# Patient Record
Sex: MALE | Race: WHITE
[De-identification: names, ages, dates, MRNs, and addresses within clinical notes are randomized per-mention and may not be internally consistent; named-entity substitution may affect disease eponyms.]

---

## 2022-11-30 ENCOUNTER — HOSPITAL ENCOUNTER (EMERGENCY)
Dept: HOSPITAL 75 - ER FS | Age: 52
Discharge: HOME | End: 2022-11-30
Payer: COMMERCIAL

## 2022-11-30 VITALS — DIASTOLIC BLOOD PRESSURE: 82 MMHG | SYSTOLIC BLOOD PRESSURE: 144 MMHG

## 2022-11-30 VITALS — WEIGHT: 134.92 LBS | HEIGHT: 69.02 IN | BODY MASS INDEX: 19.98 KG/M2

## 2022-11-30 DIAGNOSIS — N39.0: ICD-10-CM

## 2022-11-30 DIAGNOSIS — Z28.310: ICD-10-CM

## 2022-11-30 DIAGNOSIS — K56.41: Primary | ICD-10-CM

## 2022-11-30 DIAGNOSIS — Z20.822: ICD-10-CM

## 2022-11-30 LAB
ALBUMIN SERPL-MCNC: 4.3 GM/DL (ref 3.2–4.5)
ALP SERPL-CCNC: 104 U/L (ref 40–136)
ALT SERPL-CCNC: 114 U/L (ref 0–55)
APTT PPP: YELLOW S
BACTERIA #/AREA URNS HPF: (no result) /HPF
BARBITURATES UR QL: NEGATIVE
BASOPHILS # BLD AUTO: 0 10^3/UL (ref 0–0.1)
BASOPHILS NFR BLD AUTO: 1 % (ref 0–10)
BENZODIAZ UR QL SCN: NEGATIVE
BILIRUB SERPL-MCNC: 0.4 MG/DL (ref 0.1–1)
BILIRUB UR QL STRIP: NEGATIVE
BUN/CREAT SERPL: 46
CALCIUM SERPL-MCNC: 9 MG/DL (ref 8.5–10.1)
CHLORIDE SERPL-SCNC: 103 MMOL/L (ref 98–107)
CO2 SERPL-SCNC: 24 MMOL/L (ref 21–32)
COCAINE UR QL: NEGATIVE
CREAT SERPL-MCNC: 0.67 MG/DL (ref 0.6–1.3)
EOSINOPHIL # BLD AUTO: 0.1 10^3/UL (ref 0–0.3)
EOSINOPHIL NFR BLD AUTO: 1 % (ref 0–10)
FIBRINOGEN PPP-MCNC: (no result) MG/DL
GFR SERPLBLD BASED ON 1.73 SQ M-ARVRAT: 112 ML/MIN
GLUCOSE SERPL-MCNC: 123 MG/DL (ref 70–105)
GLUCOSE UR STRIP-MCNC: NEGATIVE MG/DL
HCT VFR BLD CALC: 44 % (ref 40–54)
HGB BLD-MCNC: 15.1 G/DL (ref 13.3–17.7)
KETONES UR QL STRIP: (no result)
LEUKOCYTE ESTERASE UR QL STRIP: (no result)
LIPASE SERPL-CCNC: 37 U/L (ref 8–78)
LYMPHOCYTES # BLD AUTO: 2.7 10^3/UL (ref 1–4)
LYMPHOCYTES NFR BLD AUTO: 43 % (ref 12–44)
MANUAL DIFFERENTIAL PERFORMED BLD QL: NO
MCH RBC QN AUTO: 32 PG (ref 25–34)
MCHC RBC AUTO-ENTMCNC: 35 G/DL (ref 32–36)
MCV RBC AUTO: 91 FL (ref 80–99)
METHADONE UR QL SCN: NEGATIVE
MONOCYTES # BLD AUTO: 0.5 10^3/UL (ref 0–1)
MONOCYTES NFR BLD AUTO: 9 % (ref 0–12)
NEUTROPHILS # BLD AUTO: 2.9 10^3/UL (ref 1.8–7.8)
NEUTROPHILS NFR BLD AUTO: 46 % (ref 42–75)
NITRITE UR QL STRIP: POSITIVE
OPIATES UR QL SCN: NEGATIVE
OXYCODONE UR QL: NEGATIVE
PH UR STRIP: 5 [PH] (ref 5–9)
PLATELET # BLD: 237 10^3/UL (ref 130–400)
PMV BLD AUTO: 9.2 FL (ref 9–12.2)
POTASSIUM SERPL-SCNC: 3.9 MMOL/L (ref 3.6–5)
PROPOXYPH UR QL: NEGATIVE
PROT SERPL-MCNC: 7.5 GM/DL (ref 6.4–8.2)
PROT UR QL STRIP: NEGATIVE
RBC #/AREA URNS HPF: (no result) /HPF
SODIUM SERPL-SCNC: 141 MMOL/L (ref 135–145)
SP GR UR STRIP: >=1.03 (ref 1.02–1.02)
SQUAMOUS #/AREA URNS HPF: (no result) /HPF
TRICYCLICS UR QL SCN: NEGATIVE
WBC # BLD AUTO: 6.3 10^3/UL (ref 4.3–11)
WBC #/AREA URNS HPF: (no result) /HPF

## 2022-11-30 PROCEDURE — 87636 SARSCOV2 & INF A&B AMP PRB: CPT

## 2022-11-30 PROCEDURE — 87088 URINE BACTERIA CULTURE: CPT

## 2022-11-30 PROCEDURE — 83690 ASSAY OF LIPASE: CPT

## 2022-11-30 PROCEDURE — 87077 CULTURE AEROBIC IDENTIFY: CPT

## 2022-11-30 PROCEDURE — 81000 URINALYSIS NONAUTO W/SCOPE: CPT

## 2022-11-30 PROCEDURE — 80306 DRUG TEST PRSMV INSTRMNT: CPT

## 2022-11-30 PROCEDURE — 85025 COMPLETE CBC W/AUTO DIFF WBC: CPT

## 2022-11-30 PROCEDURE — 74177 CT ABD & PELVIS W/CONTRAST: CPT

## 2022-11-30 PROCEDURE — 80053 COMPREHEN METABOLIC PANEL: CPT

## 2022-11-30 PROCEDURE — 36415 COLL VENOUS BLD VENIPUNCTURE: CPT

## 2022-11-30 NOTE — ED ABDOMINAL PAIN
General


Stated Complaint:  ABD PAIN


Source of Information:  Patient


Exam Limitations:  No Limitations





History of Present Illness


Date Seen by Provider:  Nov 30, 2022


Time Seen by Provider:  15:20


Initial Comments


52-year-old male presents for 2 hours of abdominal pain.  He states is a burning

sensation in his lower abdomen bilaterally.  No history of abdominal surgeries 

or similar issues.  He also complains of loose stools.  Denies any fevers or 

chills.  No urinary symptoms.  No testicular pain.  No nausea or vomiting.  His 

mother is concerned that he may have "fallen off the wagon."  He has a history 

of amphetamine and opiate abuse per her report.





Allergies and Home Medications


Allergies


Coded Allergies:  


     No Known Drug Allergies (Unverified , 11/30/22)





Patient Home Medication List


Home Medication List Reviewed:  Yes


Ciprofloxacin HCl (Ciprofloxacin HCl) 500 Mg Tablet, 500 MG PO BID


   Prescribed by: ABHAY HECK MD on 11/30/22 0683





Review of Systems


Review of Systems


Constitutional:  no symptoms reported


EENTM:  No Symptoms Reported


Respiratory:  No Symptoms Reported


Cardiovascular:  No Symptoms Reported


Gastrointestinal:  Abdominal Pain, Diarrhea


Genitourinary:  No Symptoms Reported


Musculoskeletal:  no symptoms reported


Skin:  no symptoms reported


Psychiatric/Neurological:  No Symptoms Reported


Endocrine:  No Symptoms Reported


Hematologic/Lymphatic:  No Symptoms Reported





Past Medical-Social-Family Hx


Patient Social History


Tobacco Use?:  Yes


Use of E-Cig and/or Vaping dev:  No


Substance use?:  Yes


Alcohol Use?:  No





Family Medical History


Reviewed Nursing Family Hx


No Pertinent Family Hx





Physical Exam


Vital Signs





Vital Signs - First Documented








 11/30/22





 15:23


 


Temp 35.7


 


Pulse 75


 


Resp 20


 


B/P (MAP) 163/89 (113)


 


O2 Delivery Room Air





Capillary Refill :


Height/Weight/BMI


Height: '"


Weight: lbs. oz. kg;  BMI


Method:


General Appearance:  WD/WN, no apparent distress


HEENT:  normal ENT inspection, pharynx normal


Neck:  non-tender, full range of motion, supple, normal inspection


Respiratory:  chest non-tender, lungs clear, normal breath sounds, no 

respiratory distress, no accessory muscle use


Cardiovascular:  regular rate, rhythm, no edema, no gallop, no JVD, no murmur


Gastrointestinal:  normal bowel sounds, soft, no organomegaly, tenderness 

(Diffuse lower abdominal tenderness even with very light palpation of the skin. 

The patient is writhing on the bed)


Extremities:  normal range of motion, normal inspection, no calf tenderness


Back:  normal inspection, no CVA tenderness, no vertebral tenderness


Neurologic/Psychiatric:  alert, normal mood/affect, oriented x 3


Skin:  normal color, warm/dry


Lymphatic:  no adenopathy





Progress/Results/Core Measures


Results/Orders


Lab Results





Laboratory Tests








Test


 11/30/22


15:30 11/30/22


15:43 11/30/22


15:48 Range/Units


 


 


Urine Color YELLOW     


 


Urine Clarity SL CLOUDY     


 


Urine pH 5.0    5-9  


 


Urine Specific Gravity >=1.030    1.016-1.022  


 


Urine Protein NEGATIVE    NEGATIVE  


 


Urine Glucose (UA) NEGATIVE    NEGATIVE  


 


Urine Ketones TRACE H   NEGATIVE  


 


Urine Nitrite POSITIVE H   NEGATIVE  


 


Urine Bilirubin NEGATIVE    NEGATIVE  


 


Urine Urobilinogen 0.2    < = 1.0  MG/DL


 


Urine Leukocyte Esterase 1+ H   NEGATIVE  


 


Urine RBC (Auto) TRACE-I H   NEGATIVE  


 


Urine RBC NONE     /HPF


 


Urine WBC 5-10 H    /HPF


 


Urine Squamous Epithelial


Cells RARE 


 


 


  /HPF





 


Urine Crystals NONE     /LPF


 


Urine Bacteria FEW H    /HPF


 


Urine Casts NONE     /LPF


 


Urine Mucus NEGATIVE     /LPF


 


Urine Culture Indicated YES     


 


Urine Opiates Screen NEGATIVE    NEGATIVE  


 


Urine Oxycodone Screen NEGATIVE    NEGATIVE  


 


Urine Methadone Screen NEGATIVE    NEGATIVE  


 


Urine Propoxyphene Screen NEGATIVE    NEGATIVE  


 


Urine Barbiturates Screen NEGATIVE    NEGATIVE  


 


Ur Tricyclic Antidepressants


Screen NEGATIVE 


 


 


 NEGATIVE  





 


Urine Phencyclidine Screen NEGATIVE    NEGATIVE  


 


Urine Amphetamines Screen POSITIVE H   NEGATIVE  


 


Urine Methamphetamines Screen POSITIVE H   NEGATIVE  


 


Urine Benzodiazepines Screen NEGATIVE    NEGATIVE  


 


Urine Cocaine Screen NEGATIVE    NEGATIVE  


 


Urine Cannabinoids Screen POSITIVE H   NEGATIVE  


 


White Blood Count


 


 6.3 


 


 4.3-11.0


10^3/uL


 


Red Blood Count


 


 4.79 


 


 4.30-5.52


10^6/uL


 


Hemoglobin  15.1   13.3-17.7  g/dL


 


Hematocrit  44   40-54  %


 


Mean Corpuscular Volume  91   80-99  fL


 


Mean Corpuscular Hemoglobin  32   25-34  pg


 


Mean Corpuscular Hemoglobin


Concent 


 35 


 


 32-36  g/dL





 


Red Cell Distribution Width  11.9   10.0-14.5  %


 


Platelet Count


 


 237 


 


 130-400


10^3/uL


 


Mean Platelet Volume  9.2   9.0-12.2  fL


 


Immature Granulocyte % (Auto)  0    %


 


Neutrophils (%) (Auto)  46   42-75  %


 


Lymphocytes (%) (Auto)  43   12-44  %


 


Monocytes (%) (Auto)  9   0-12  %


 


Eosinophils (%) (Auto)  1   0-10  %


 


Basophils (%) (Auto)  1   0-10  %


 


Neutrophils # (Auto)


 


 2.9 


 


 1.8-7.8


10^3/uL


 


Lymphocytes # (Auto)


 


 2.7 


 


 1.0-4.0


10^3/uL


 


Monocytes # (Auto)


 


 0.5 


 


 0.0-1.0


10^3/uL


 


Eosinophils # (Auto)


 


 0.1 


 


 0.0-0.3


10^3/uL


 


Basophils # (Auto)


 


 0.0 


 


 0.0-0.1


10^3/uL


 


Immature Granulocyte # (Auto)


 


 0.0 


 


 0.0-0.1


10^3/uL


 


Sodium Level  141   135-145  MMOL/L


 


Potassium Level  3.9   3.6-5.0  MMOL/L


 


Chloride Level  103     MMOL/L


 


Carbon Dioxide Level  24   21-32  MMOL/L


 


Anion Gap  14   5-14  MMOL/L


 


Blood Urea Nitrogen  31 H  7-18  MG/DL


 


Creatinine


 


 0.67 


 


 0.60-1.30


MG/DL


 


Estimat Glomerular Filtration


Rate 


 112 


 


  





 


BUN/Creatinine Ratio  46    


 


Glucose Level  123 H    MG/DL


 


Calcium Level  9.0   8.5-10.1  MG/DL


 


Corrected Calcium  8.8   8.5-10.1  MG/DL


 


Total Bilirubin  0.4   0.1-1.0  MG/DL


 


Aspartate Amino Transf


(AST/SGOT) 


 81 H


 


 5-34  U/L





 


Alanine Aminotransferase


(ALT/SGPT) 


 114 H


 


 0-55  U/L





 


Alkaline Phosphatase  104     U/L


 


Total Protein  7.5   6.4-8.2  GM/DL


 


Albumin  4.3   3.2-4.5  GM/DL


 


Lipase  37   8-78  U/L


 


Influenza Type A (RT-PCR)   Not Detected  Not Detecte  


 


Influenza Type B (RT-PCR)   Not Detected  Not Detecte  


 


SARS-CoV-2 RNA (RT-PCR)   Not Detected  Not Detecte  








My Orders





Orders - ABHAY HECK DO


Comprehensive Metabolic Panel (11/30/22 15:33)


Cbc With Automated Diff (11/30/22 15:33)


Lipase (11/30/22 15:33)


Ua Culture If Indicated (11/30/22 15:33)


Drug Screen Stat (Urine) (11/30/22 15:33)


Ct Abdomen/Pelvis W (11/30/22 15:46)


Ketorolac Injection (Toradol Injection) (11/30/22 16:00)


Urine Culture (11/30/22 15:30)


Iohexol Injection (Omnipaque 350 Mg/Ml 1 (11/30/22 16:45)


Received Contrast (Hold Metformin- Contr (11/30/22 16:45)


Sodium Chloride Flush (Catheter Flush Sy (11/30/22 16:45)


Ns (Ivpb) (Sodium Chloride 0.9% Ivpb Bag (11/30/22 16:45)


Covid 19 Inhouse Test (11/30/22 16:42)


Influenza A And B By Pcr (11/30/22 16:42)


Isolation Central Supply Req (11/30/22 16:42)





Medications Given in ED





Current Medications








 Medications  Dose


 Ordered  Sig/Adri


 Route  Start Time


 Stop Time Status Last Admin


Dose Admin


 


 Iohexol  75 ml  ONCE  ONCE


 IV  11/30/22 16:45


 11/30/22 16:46 DC 11/30/22 16:59


75 ML


 


 Ketorolac


 Tromethamine  30 mg  ONCE  ONCE


 IVP  11/30/22 16:00


 11/30/22 16:01 DC 11/30/22 15:54


30 MG


 


 Sodium Chloride  10 ml  AS NEEDED  PRN


 IV  11/30/22 16:45


    11/30/22 16:59


10 ML


 


 Sodium Chloride  100 ml  ONCE  ONCE


 IV  11/30/22 16:45


 11/30/22 16:46 DC 11/30/22 16:59


40 ML








Vital Signs/I&O











 11/30/22





 15:23


 


Temp 35.7


 


Pulse 75


 


Resp 20


 


B/P (MAP) 163/89 (113)


 


O2 Delivery Room Air











Departure


Communication (Admissions)


The patient has a fecal impaction on CT scan.  He had a large bowel movement 

while awaiting these results and his abdominal pain has improved, nearly 

nonexistent at this point.  He is hemodynamically stable.  He does have what 

appears to be a slight urinary tract infection.  offered testing for STIs which 

he refused as he states he has not had any sexual contacts.  Treated with Cipro 

for UTI





Impression





   Primary Impression:  


   Fecal impaction in rectum


   Additional Impression:  


   Urinary tract infection


   Qualified Codes:  N30.00 - Acute cystitis without hematuria


Disposition:  01 HOME, SELF-CARE


Condition:  Stable





Departure-Patient Inst.


Referrals:  


NO,LOCAL PHYSICIAN (PCP/Family)


Primary Care Physician


Patient Instructions:  Fecal Impaction (DC)





Add. Discharge Instructions:  


Use a stool softener such as MiraLAX or the like for the next couple of days.  I

ncrease your fluids at home.  You do appear to have a UTI.  Take the antibiotics

as prescribed until they are gone.  Return to the emergency department for any 

severe concerns.


Scripts


Ciprofloxacin HCl (Ciprofloxacin HCl) 500 Mg Tablet


500 MG PO BID for 5 Days, #10 TAB


   Prov: ABHAY HECK DO         11/30/22











ABHAY HECK DO            Nov 30, 2022 15:25

## 2022-11-30 NOTE — DIAGNOSTIC IMAGING REPORT
EXAMINATION: CT abdomen and pelvis with contrast, 11/30/2022.



TECHNIQUE: Multiple contiguous axial images were obtained through

the abdomen and pelvis after administration of intravenous

contrast. Auto Exposure Controls were utilized during the CT exam

to meet ALARA standards for radiation dose reduction. All CT

scans use one or more of the following dose optimizing

techniques: Automated exposure control, MA and/or KvP adjustment

based on patient size and exam type or iterative reconstruction.



INDICATION: Abdominal pain with nausea and vomiting.



COMPARISONS: None.



FINDINGS:



Lung bases are clear. Small hiatal hernia is noted. The liver and

spleen are unremarkable. Gallbladder is distended without

surrounding inflammatory change appreciated. The adrenal glands

and pancreas are unremarkable.



There are diffusely slightly dilated loops of large bowel with

findings of marked constipation. Fecal impaction noted at the

rectosigmoid. No transition point or obstruction seen throughout

the colon. The appendix is not seen with certainty. No focal

inflammatory changes are seen in the right lower quadrant.

Minimally prominent fluid-filled small bowel loops in the right

lower quadrant and mid abdomen noted likely due to a mild ileus

with enteritis not excluded.



There is no free air with no significant free fluid appreciated.

There is fluid, however, within the right inguinal canal with a

likely herniation noted.



There is atherosclerotic disease.



Kidneys are unremarkable. There is no acute osseous abnormality.



IMPRESSION:

1. Findings of fecal impaction with marked changes of

constipation and secondary dilatation of the colon.

2. Secondary mild ileus versus enteritis. Other findings as

above.



Dictated by: 



  Dictated on workstation # [id number]

## 2023-03-28 ENCOUNTER — HOSPITAL ENCOUNTER (EMERGENCY)
Dept: HOSPITAL 75 - ER FS | Age: 53
Discharge: HOME | End: 2023-03-28
Payer: MEDICAID

## 2023-03-28 VITALS — DIASTOLIC BLOOD PRESSURE: 95 MMHG | SYSTOLIC BLOOD PRESSURE: 127 MMHG

## 2023-03-28 DIAGNOSIS — Z28.310: ICD-10-CM

## 2023-03-28 DIAGNOSIS — L23.7: Primary | ICD-10-CM

## 2023-03-28 PROCEDURE — 99281 EMR DPT VST MAYX REQ PHY/QHP: CPT

## 2023-03-28 NOTE — ED INTEGUMENTARY GENERAL
General


Chief Complaint:  Skin/Wound Problems


Stated Complaint:  RASH


Source:  patient, family (mother)


Exam Limitations:  no limitations





History of Present Illness


Date Seen by Provider:  Mar 28, 2023


Time Seen by Provider:  09:15


Initial Comments


52-year-old male presents to the emergency department today for evaluation of a 

poison ivy or other contact dermatitis.  He was pulling weeds and clearing brush

and flower bed yesterday and had symptoms today.  He states it is on his arms, 

face and in his genital region.  Significantly pruritic.  No fevers or chills.  

No other exposures.





All other systems reviewed and negative except documented per HPI.





Voice recognition software was used to help create this chart





Allergies and Home Medications


Allergies


Coded Allergies:  


     No Known Drug Allergies (Unverified , 11/30/22)





Patient Home Medication List


Home Medication List Reviewed:  Yes


Ciprofloxacin HCl (Ciprofloxacin HCl) 500 Mg Tablet, 500 MG PO BID


   Prescribed by: ABHAY HECK MD on 11/30/22 3214





Review of Systems


Review of Systems


Constitutional:  see HPI





Past Medical-Social-Family Hx


Patient Social History


Tobacco Use?:  No


Use of E-Cig and/or Vaping dev:  No


Substance use?:  No


Alcohol Use?:  No


Pt feels they are or have been:  No





Immunizations Up To Date


Influenza Vaccine Up-to-Date:  No; Not Current


First/Initial COVID19 Vaccinat:  Denies





Past Medical History


Surgery/Hospitalization HX:  


Denies





Family Medical History


No Pertinent Family Hx





Physical Exam


Vital Signs


Capillary Refill :


General Appearance:  WD/WN, no apparent distress


HEENT:  normal ENT inspection, pharynx normal


Neck:  non-tender, full range of motion


Cardiovascular:  regular rate, rhythm, no murmur


Respiratory:  chest non-tender, lungs clear, normal breath sounds


Gastrointestinal:  normal bowel sounds, non tender, soft


Extremities:  normal range of motion, non-tender, normal capillary refill


Skin:  other (Diffuse macular rash)





Departure


Communication (Admissions)


Patient is hemodynamically stable.  He has no systemic symptoms.  Symptoms 

consistent with contact dermatitis based on exam and history.  To be given p.o. 

steroids and recommended to use p.o. Benadryl.  Also recommended calamine lotion

would like over-the-counter.  Discharged in stable condition.





Impression





   Primary Impression:  


   Contact dermatitis due to poison ivy


Disposition:  01 HOME, SELF-CARE


Condition:  Stable





Departure-Patient Inst.


Referrals:  


NO,LOCAL PHYSICIAN (PCP/Family)


Primary Care Physician


Patient Instructions:  Poison [place], Poison [place], Poison [place] ED





Add. Discharge Instructions:  


Take the steroid medication as prescribed until it is gone.  Use Benadryl 

over-the-counter as well. the Benadryl may make you drowsy.  You may want to use

calamine lotion as well which is over-the-counter and can help soothe the 

itching and discomfort.





All discharge instructions reviewed with patient and/or family. Voiced 

understanding.


Scripts


Prednisone (Prednisone) 50 Mg Tab


50 MG PO DAILY for 5 Days, #5 TAB


   Prov: ABHAY HECK DO         3/28/23











ABHAY HECK DO            Mar 28, 2023 09:27